# Patient Record
Sex: FEMALE | Race: ASIAN | NOT HISPANIC OR LATINO | URBAN - METROPOLITAN AREA
[De-identification: names, ages, dates, MRNs, and addresses within clinical notes are randomized per-mention and may not be internally consistent; named-entity substitution may affect disease eponyms.]

---

## 2022-11-30 ENCOUNTER — PATIENT ROUNDING (BHMG ONLY) (OUTPATIENT)
Dept: FAMILY MEDICINE CLINIC | Facility: CLINIC | Age: 27
End: 2022-11-30

## 2022-11-30 ENCOUNTER — OFFICE VISIT (OUTPATIENT)
Dept: FAMILY MEDICINE CLINIC | Facility: CLINIC | Age: 27
End: 2022-11-30

## 2022-11-30 VITALS
HEIGHT: 65 IN | SYSTOLIC BLOOD PRESSURE: 100 MMHG | BODY MASS INDEX: 22.71 KG/M2 | HEART RATE: 82 BPM | TEMPERATURE: 98 F | OXYGEN SATURATION: 98 % | WEIGHT: 136.3 LBS | DIASTOLIC BLOOD PRESSURE: 70 MMHG

## 2022-11-30 DIAGNOSIS — Z12.4 ENCOUNTER FOR PAPANICOLAOU SMEAR FOR CERVICAL CANCER SCREENING: ICD-10-CM

## 2022-11-30 DIAGNOSIS — L72.3 INFLAMED SEBACEOUS CYST: ICD-10-CM

## 2022-11-30 DIAGNOSIS — Z00.00 ENCOUNTER FOR PREVENTIVE CARE: ICD-10-CM

## 2022-11-30 DIAGNOSIS — Z00.00 ANNUAL PHYSICAL EXAM: Primary | ICD-10-CM

## 2022-11-30 DIAGNOSIS — Z23 NEED FOR PROPHYLACTIC VACCINATION WITH COMBINED DIPHTHERIA-TETANUS-PERTUSSIS (DTP) VACCINE: ICD-10-CM

## 2022-11-30 PROCEDURE — 90715 TDAP VACCINE 7 YRS/> IM: CPT | Performed by: STUDENT IN AN ORGANIZED HEALTH CARE EDUCATION/TRAINING PROGRAM

## 2022-11-30 PROCEDURE — 90471 IMMUNIZATION ADMIN: CPT | Performed by: STUDENT IN AN ORGANIZED HEALTH CARE EDUCATION/TRAINING PROGRAM

## 2022-11-30 PROCEDURE — 99213 OFFICE O/P EST LOW 20 MIN: CPT | Performed by: STUDENT IN AN ORGANIZED HEALTH CARE EDUCATION/TRAINING PROGRAM

## 2022-11-30 PROCEDURE — 99385 PREV VISIT NEW AGE 18-39: CPT | Performed by: STUDENT IN AN ORGANIZED HEALTH CARE EDUCATION/TRAINING PROGRAM

## 2022-11-30 NOTE — PROGRESS NOTES
November 30, 2022      Junior Alcocer,     I want to officially welcome you to our practice and ask about your recent visit.     Overall were you satisfied with your visit? YES      Would you recommend our office to friends and family?   YES    Your experience is very important to us.  You may receive an email regarding a survey.  Please take a moment to complete.  This will help us to improve.      I appreciate you taking the time to answer these questions.       Thank you and have a great day.

## 2022-11-30 NOTE — PROGRESS NOTES
"  Chief Complaint  Pt presented to clinic with   Chief Complaint   Patient presents with   • Establish Care          History of Present Illness  Ms. Segura 27-year-old female who presented to the clinic for the first time for establishing medical care, annual physical exam and with chief complaint of having lump on the front of her chest for almost a year.  Patient stated her lump was actually very small in size in the beginning even smaller than the size of a pea but recently she noticed it is growing bigger in size and then since last week lump is painful.  Review of Systems   Constitutional: Negative for activity change, appetite change, fatigue, fever and unexpected weight change.   HENT: Negative for congestion, rhinorrhea, sore throat and voice change.    Respiratory: Negative for cough, chest tightness, shortness of breath and wheezing.    Cardiovascular: Negative for chest pain and palpitations.   Gastrointestinal: Negative for abdominal distention, abdominal pain, diarrhea, nausea and vomiting.   Genitourinary: Negative for difficulty urinating and dysuria.   Musculoskeletal: Negative for arthralgias and myalgias.   Skin: Negative for rash.   Neurological: Negative for dizziness, tremors and weakness.   Hematological: Negative for adenopathy.   Psychiatric/Behavioral: Negative for sleep disturbance. The patient is not nervous/anxious.        PMH:   No outpatient medications prior to visit.     No facility-administered medications prior to visit.      No Known Allergies  History reviewed. No pertinent surgical history.  family history is not on file.   reports that she has never smoked. She has never used smokeless tobacco. She reports current alcohol use of about 2.0 standard drinks per week. She reports that she does not use drugs.     /70   Pulse 82   Temp 98 °F (36.7 °C)   Ht 165.1 cm (65\")   Wt 61.8 kg (136 lb 4.8 oz)   SpO2 98%   BMI 22.68 kg/m²   Physical Exam  HENT:      Head: " Normocephalic and atraumatic.      Mouth/Throat:      Mouth: Mucous membranes are moist.      Pharynx: Oropharynx is clear.   Eyes:      Extraocular Movements: Extraocular movements intact.      Conjunctiva/sclera: Conjunctivae normal.      Pupils: Pupils are equal, round, and reactive to light.   Cardiovascular:      Rate and Rhythm: Normal rate and regular rhythm.   Pulmonary:      Effort: Pulmonary effort is normal.      Breath sounds: Normal breath sounds.   Abdominal:      General: Bowel sounds are normal.      Palpations: Abdomen is soft.   Musculoskeletal:         General: Normal range of motion.      Cervical back: Neck supple.   Skin:     General: Skin is warm.      Capillary Refill: Capillary refill takes less than 2 seconds.   Neurological:      General: No focal deficit present.      Mental Status: She is alert and oriented to person, place, and time. Mental status is at baseline.   Psychiatric:         Mood and Affect: Mood normal.          Diagnoses and all orders for this visit:    1. Annual physical exam (Primary)    2. Encounter for Papanicolaou smear for cervical cancer screening  -     Ambulatory Referral to Obstetrics / Gynecology    3. Need for prophylactic vaccination with combined diphtheria-tetanus-pertussis (DTP) vaccine  -     Tdap Vaccine Greater Than or Equal To 6yo IM    4. Inflamed sebaceous cyst    Annual physical exam  Patient stated labs have been done in her office and she will leave lab report for scanning later today.  Will review the lab report.    Sebaceous cyst  Advised patient to apply antibiotic cream over the lump, sample given today  It is possible that secretion can come out from the lump by itself and then lump is expected to resolve or if it continued to be growing and painful then patient will update me and will refer patient to the surgeon  Needs yearly Flu vaccine  Dental visit and exam every year  Seat Belt always when driving or riding cars  Safe sex life to avoid  STD  Healthy heart diet  Exercise 3 times a week    Preventive care  Tdap shot given today, flu shot not available in the office, encourage patient to get flu shot from her pharmacy  Pap smear referral given    Follow Up  1 year

## 2022-12-08 DIAGNOSIS — L08.9 INFECTED SEBACEOUS CYST: Primary | ICD-10-CM

## 2022-12-08 DIAGNOSIS — L72.3 INFECTED SEBACEOUS CYST: Primary | ICD-10-CM

## 2022-12-08 RX ORDER — DOXYCYCLINE HYCLATE 100 MG/1
100 CAPSULE ORAL 2 TIMES DAILY
Qty: 10 CAPSULE | Refills: 0 | Status: SHIPPED | OUTPATIENT
Start: 2022-12-08 | End: 2022-12-22

## 2022-12-15 DIAGNOSIS — L72.3 INFECTED SEBACEOUS CYST: Primary | ICD-10-CM

## 2022-12-15 DIAGNOSIS — L08.9 INFECTED SEBACEOUS CYST: Primary | ICD-10-CM

## 2022-12-22 ENCOUNTER — OFFICE VISIT (OUTPATIENT)
Dept: SURGERY | Facility: CLINIC | Age: 27
End: 2022-12-22

## 2022-12-22 ENCOUNTER — TELEPHONE (OUTPATIENT)
Dept: SURGERY | Facility: CLINIC | Age: 27
End: 2022-12-22

## 2022-12-22 VITALS — HEIGHT: 65 IN | BODY MASS INDEX: 22.33 KG/M2 | WEIGHT: 134 LBS

## 2022-12-22 DIAGNOSIS — L08.9 INFECTED SEBACEOUS CYST: Primary | ICD-10-CM

## 2022-12-22 DIAGNOSIS — L72.3 INFECTED SEBACEOUS CYST: Primary | ICD-10-CM

## 2022-12-22 PROCEDURE — 10060 I&D ABSCESS SIMPLE/SINGLE: CPT | Performed by: SURGERY

## 2022-12-22 PROCEDURE — 87070 CULTURE OTHR SPECIMN AEROBIC: CPT | Performed by: SURGERY

## 2022-12-22 PROCEDURE — 87205 SMEAR GRAM STAIN: CPT | Performed by: SURGERY

## 2022-12-22 PROCEDURE — 99203 OFFICE O/P NEW LOW 30 MIN: CPT | Performed by: SURGERY

## 2022-12-22 RX ORDER — DOXYCYCLINE HYCLATE 100 MG/1
100 CAPSULE ORAL 2 TIMES DAILY
Qty: 14 CAPSULE | Refills: 0 | Status: SHIPPED | OUTPATIENT
Start: 2022-12-22 | End: 2022-12-29

## 2022-12-23 NOTE — PROGRESS NOTES
Procedure note     DATE OF Procedure:12/22/23      SURGEON:   Yimi Schultz MD     PREOPERATIVE DIAGNOSIS: Infected sebaceous cyst     POSTOPERATIVE DIAGNOSIS: Infected sebaceous cyst with abscess     PROCEDURE PERFORMED: Incision and drainage of infected sebaceous cyst with abscess     ANESTHESIA: Local     SPECIMEN: Wound Culture     DRAINS: None     BLOOD LOSS: minimal     INDICATIONS FOR OPERATION: Ms. Imelda Alcocer is a 27 y.o. lady with an infected sebaceous cyst on her right upper chest.  Incision and drainage of the abscess and cyst was recommended. All risks , benefits, and alternatives were explained to the patient and she agreed and wished to proceed.  Informed consent was obtained.     OPERATIVE REPORT: In the procedure room a timeout was performed identifying correct patient, procedure, and location of procedure.  The area of the abscess was prepped and draped per clinic policy.  Local anesthetic was injected overlying the area of maximum fluctuance.  An incision was made over this area and a culture obtained.    10 mL of purulent and keratinaceous material was expressed from the cavity.  The cavity was irrigated and packed with Nu Gauze and covered with a dry dressing.  She tolerated the procedure well.        Yimi Schultz M.D.  General and Endoscopic Surgery  [facility]     [address], [address]  [place], [place], [address]  P: [phone]  F: [phone]

## 2022-12-23 NOTE — PROGRESS NOTES
General Surgery H&P/Consultation    Impression/Plan:    Miss. Imelda Alcocer is a 27 y.o. with an infected sebaceous cyst on her right chest.  She has been on doxycycline.  I recommended incision and drainage of the cyst to resolve the acute infection.  Should she have recurrence of the cyst in the future I would recommend elective excision at that time as this will minimize her scarring related to the procedure. All risks (including bleeding, infection, damage to surrounding structures), benefits, and alternatives were explained to the patient who agreed and wished to proceed.  I will see her back in 1 week and have prescribed an additional week of doxycycline to complete after drainage.    Referring Provider: Walt Edouard MD    Chief Complaint:    Infected cyst    History of Present Illness:    Miss. Imelda Alcocer is a 27 y.o. presenting for evaluation of an infected cyst on her chest.  She originally noticed a pea-sized nodule underneath the skin.  Since Thanksgiving she has noted that this has been getting larger and more inflamed.  She is currently on doxycycline to treat this.    Past Medical History:   History reviewed. No pertinent past medical history.       Past Surgical History:    History reviewed. No pertinent surgical history.      Family History:    History reviewed. No pertinent family history.      Social History:    Social History     Socioeconomic History   • Marital status:    Tobacco Use   • Smoking status: Never   • Smokeless tobacco: Never   Vaping Use   • Vaping Use: Never used   Substance and Sexual Activity   • Alcohol use: Yes     Alcohol/week: 2.0 standard drinks     Types: 1 Glasses of wine, 1 Drinks containing 0.5 oz of alcohol per week   • Drug use: Never   • Sexual activity: Yes     Partners: Male     Birth control/protection: Condom         Allergies:   No Known Allergies    Medications:     Current Outpatient Medications:   •  doxycycline (VIBRAMYCIN) 100 MG  capsule, Take 1 capsule by mouth 2 (Two) Times a Day for 7 days., Disp: 14 capsule, Rfl: 0    Radiology/Endoscopy:    • No relevant radiology to review    Labs:    • No relevant labs for review        Physical Exam:   • Constitutional: Well-developed well-nourished, no acute distress  • Skin: Fluctuant, indurated, erythematous 3 cm cyst/abscess on the upper inner quadrant of the right  breast near the sternum        Yimi Schultz MD  General and Endoscopic Surgery  [facility]    [address], [address]  [place], [place], [address]  P: [phone]  F: [phone]

## 2022-12-25 LAB
BACTERIA SPEC AEROBE CULT: NORMAL
GRAM STN SPEC: NORMAL

## 2022-12-27 RX ORDER — CEPHALEXIN 500 MG/1
500 CAPSULE ORAL 4 TIMES DAILY
Qty: 12 CAPSULE | Refills: 0 | Status: SHIPPED | OUTPATIENT
Start: 2022-12-27 | End: 2022-12-30

## 2022-12-28 ENCOUNTER — OFFICE VISIT (OUTPATIENT)
Dept: FAMILY MEDICINE CLINIC | Facility: CLINIC | Age: 27
End: 2022-12-28

## 2022-12-28 VITALS
SYSTOLIC BLOOD PRESSURE: 102 MMHG | WEIGHT: 138.2 LBS | TEMPERATURE: 98.4 F | BODY MASS INDEX: 23.03 KG/M2 | HEART RATE: 67 BPM | HEIGHT: 65 IN | OXYGEN SATURATION: 100 % | DIASTOLIC BLOOD PRESSURE: 62 MMHG

## 2022-12-28 DIAGNOSIS — K22.4 ESOPHAGEAL SPASM: Primary | ICD-10-CM

## 2022-12-28 DIAGNOSIS — K59.00 CONSTIPATION, UNSPECIFIED CONSTIPATION TYPE: ICD-10-CM

## 2022-12-28 PROCEDURE — 99214 OFFICE O/P EST MOD 30 MIN: CPT | Performed by: STUDENT IN AN ORGANIZED HEALTH CARE EDUCATION/TRAINING PROGRAM

## 2022-12-28 RX ORDER — POLYETHYLENE GLYCOL 3350 17 G/17G
17 POWDER, FOR SOLUTION ORAL DAILY
Qty: 10 EACH | Refills: 0 | Status: SHIPPED | OUTPATIENT
Start: 2022-12-28 | End: 2023-01-05

## 2022-12-28 RX ORDER — DICYCLOMINE HYDROCHLORIDE 10 MG/1
10 CAPSULE ORAL
Qty: 30 CAPSULE | Refills: 0 | Status: SHIPPED | OUTPATIENT
Start: 2022-12-28

## 2022-12-28 NOTE — PROGRESS NOTES
"Chief Complaint  Back Pain (Upper middle back, hurting when swallowing, shooting pain, pressure on chest radiates to back)    Smiley Alcocer presents to [facility]  History of Present Illness  For upper mid back pain since past 7 days.  2 weeks ago patient was started on doxycycline for infected sebaceous cyst.  Patient underwent incision and drainage a week ago and last dose of doxycycline she took was this last Monday.  Patient stated she developed symptoms of back pain and severe cramping like pain especially on drinking water or eating food.  In the beginning 5 days ago patient symptoms severity was on scale 9.  Then 2 days ago patient went to ER waited there for 8 hours and then returned back home without being evaluated by any physician.  Patient stated she took muscle relaxant and after that she noticed her symptoms scale severity decreases to 2.  Today morning she noticed her symptom severity increases to 6 again and came here for evaluation.  Denies having any abdominal pain, nausea or vomiting.  Patient is having issues of constipation.  Review of system is negative for fever, headache, chest pain, shortness of breath, palpitation, nausea, vomiting, any recent change in bladder habits.    Back Pain  This is a new problem. The current episode started in the past 7 days. The problem occurs rarely. The problem has been waxing and waning since onset. The pain is present in the thoracic spine. The quality of the pain is described as cramping and shooting. The pain does not radiate. The pain is at a severity of 8/10. The pain is the same all the time. The symptoms are aggravated by position. Stiffness is present all day. Risk factors include lack of exercise and poor posture.       Objective   Vital Signs:  /62 (BP Location: Left arm, Patient Position: Sitting)   Pulse 67   Temp 98.4 °F (36.9 °C)   Ht 165.1 cm (65\")   Wt 62.7 kg (138 lb 3.2 oz)   " "SpO2 100%   BMI 23.00 kg/m²   Estimated body mass index is 23 kg/m² as calculated from the following:    Height as of this encounter: 165.1 cm (65\").    Weight as of this encounter: 62.7 kg (138 lb 3.2 oz).    BMI is within normal parameters. No other follow-up for BMI required.      Physical Exam  HENT:      Head: Normocephalic and atraumatic.      Mouth/Throat:      Mouth: Mucous membranes are moist.      Pharynx: Oropharynx is clear.   Eyes:      Extraocular Movements: Extraocular movements intact.      Conjunctiva/sclera: Conjunctivae normal.      Pupils: Pupils are equal, round, and reactive to light.   Cardiovascular:      Rate and Rhythm: Normal rate and regular rhythm.   Pulmonary:      Effort: Pulmonary effort is normal.      Breath sounds: Normal breath sounds.   Abdominal:      General: Bowel sounds are normal.      Palpations: Abdomen is soft.   Musculoskeletal:         General: Normal range of motion.      Cervical back: Neck supple.   Skin:     General: Skin is warm.      Capillary Refill: Capillary refill takes less than 2 seconds.   Neurological:      General: No focal deficit present.      Mental Status: She is alert and oriented to person, place, and time. Mental status is at baseline.   Psychiatric:         Mood and Affect: Mood normal.        Result Review :                Assessment and Plan   Diagnoses and all orders for this visit:    1. Esophageal spasm (Primary)  -     dicyclomine (BENTYL) 10 MG capsule; Take 1 capsule by mouth 3 (Three) Times a Day Before Meals.  Dispense: 30 capsule; Refill: 0    2. Constipation, unspecified constipation type  -     polyethylene glycol (MIRALAX) 17 g packet; Take 17 g by mouth Daily.  Dispense: 10 each; Refill: 0    It is possible that patient is getting esophageal spasm/esophagitis from doxycycline.  Patient is advised not to take doxycycline again.  Advised to take Protonix 2 times a day for 2 weeks and after that once a day for 1 week and then as " needed  Also prescribed dicyclomine 1 capsule 3 times a day before meals at least 30 minutes.  Patient can try dicyclomine for a day or 2 and if symptoms are improving then patient can take it as needed.  Patient is advised to come back if no relief from dicyclomine  RTC or ER if symptoms worsen or persist      Constipation  Patient is given MiraLAX powder to drink every day and also educated that dicyclomine can also cause constipation.         Follow Up   No follow-ups on file.  Patient was given instructions and counseling regarding her condition or for health maintenance advice. Please see specific information pulled into the AVS if appropriate.       Answers for HPI/ROS submitted by the patient on 12/28/2022  What is the primary reason for your visit?: Back Pain

## 2023-01-05 ENCOUNTER — OFFICE VISIT (OUTPATIENT)
Dept: SURGERY | Facility: CLINIC | Age: 28
End: 2023-01-05
Payer: COMMERCIAL

## 2023-01-05 DIAGNOSIS — L72.3 INFECTED SEBACEOUS CYST: Primary | ICD-10-CM

## 2023-01-05 DIAGNOSIS — L08.9 INFECTED SEBACEOUS CYST: Primary | ICD-10-CM

## 2023-01-05 PROCEDURE — 99213 OFFICE O/P EST LOW 20 MIN: CPT | Performed by: SURGERY

## 2023-01-06 NOTE — PROGRESS NOTES
ASSESSMENT/PLAN:    Ms. Yanes is a 27-year-old with I&D of an infected sebaceous cyst on 12/22/2022.  She was unable to take doxycycline due to nausea and gastritis.  The cyst has no signs of surrounding infection currently and culture was no growth with rare bacteria on the gram stain.  I have recommended allowing the cyst to grow followed by an office excision in 2 weeks.  The risk, benefits, and alternatives were discussed with her and she is in agreement with proceeding.    CC:     Chief Complaint   Patient presents with   • Follow-up        HPI:    Minimal drainage from the cyst at the site of the I&D.  She was unable to tolerate the doxycycline and given improvement did not take the Keflex.  No fevers or purulent drainage from the I&D site    SOCIAL HISTORY:   Social Determinants of Health     Tobacco Use: Low Risk    • Smoking Tobacco Use: Never   • Smokeless Tobacco Use: Never   • Passive Exposure: Not on file   Alcohol Use: Not on file   Financial Resource Strain: Not on file   Food Insecurity: Not on file   Transportation Needs: Not on file   Physical Activity: Not on file   Stress: Not on file   Social Connections: Not on file   Intimate Partner Violence: Not on file   Depression: Not at risk   • PHQ-2 Score: 0   Housing Stability: Not on file        FAMILY HISTORY:    History reviewed. No pertinent family history.     OTHER SURGERY:  History reviewed. No pertinent surgical history.     PAST MEDICAL HISTORY:    History reviewed. No pertinent past medical history.     MEDICATIONS:   Current Outpatient Medications on File Prior to Visit   Medication Sig Dispense Refill   • dicyclomine (BENTYL) 10 MG capsule Take 1 capsule by mouth 3 (Three) Times a Day Before Meals. 30 capsule 0     No current facility-administered medications on file prior to visit.        ALLERGIES:   Allergies   Allergen Reactions   • Doxycycline GI Intolerance        PHYSICAL EXAM:   • Constitutional: Well-developed well-nourished, no  acute distress  • Skin: 1.5 cm cyst of right medial chest without cellulitis or significant induration, healing site of incision and drainage    Yimi Schultz M.D.  General and Endoscopic Surgery  [facility]    [address], [address]  [place], [place], [address]  P: [phone]  F: [phone]

## 2023-02-02 ENCOUNTER — PROCEDURE VISIT (OUTPATIENT)
Dept: SURGERY | Facility: CLINIC | Age: 28
End: 2023-02-02
Payer: COMMERCIAL

## 2023-02-02 VITALS — HEIGHT: 65 IN | WEIGHT: 138 LBS | BODY MASS INDEX: 22.99 KG/M2

## 2023-02-02 DIAGNOSIS — L72.3 SEBACEOUS CYST: ICD-10-CM

## 2023-02-02 DIAGNOSIS — L08.9 INFECTED SEBACEOUS CYST: Primary | ICD-10-CM

## 2023-02-02 DIAGNOSIS — L72.3 INFECTED SEBACEOUS CYST: Primary | ICD-10-CM

## 2023-02-02 PROCEDURE — 11402 EXC TR-EXT B9+MARG 1.1-2 CM: CPT | Performed by: SURGERY

## 2023-02-02 PROCEDURE — 12031 INTMD RPR S/A/T/EXT 2.5 CM/<: CPT | Performed by: SURGERY

## 2023-02-02 PROCEDURE — 88304 TISSUE EXAM BY PATHOLOGIST: CPT | Performed by: SURGERY

## 2023-02-02 NOTE — PROGRESS NOTES
DATE OF Procedure: 02/02/23      SURGEON:   Yimi Schultz MD     PREOPERATIVE DIAGNOSIS:  History of ruptured epidermal inclusion cyst     POSTOPERATIVE DIAGNOSIS: Same     PROCEDURE PERFORMED:  Excision of epidermal inclusion cyst     ANESTHESIA: Local     SPECIMEN:  Epidermal inclusion cyst (1.5 x 1 cm)     BLOOD LOSS: minimal     INDICATIONS FOR OPERATION:  Ms. Yanes is a 27-year-old lady with history of a ruptured and infected epidermal inclusion cyst status post incision and drainage on her chest.  Recommended proceeding with excision of the remaining cyst cavity now that it is healed and there is no signs of infection..    She desired excision. All risks , benefits, and alternatives were explained to the patient and  she agreed and wished to proceed.  Informed consent was obtained.     OPERATIVE REPORT: In the procedure room a timeout was performed identifying correct patient, procedure, and location of procedure.    Local anesthetic was injected.  An elliptical skin incision was made overlying the soft tissue mass at the site of the skin lesion.  The cyst cavity and surrounding subcutaneous tissue was removed.  It measured 1.5 x 1 x 1 cm.  The skin was closed with 3-0 Vicryl and 4-0 Monocryl in 2 layers.  Glue was applied over the incision.  She tolerated the procedure well.          Yimi Schultz M.D.  General and Endoscopic Surgery  [facility]     [address], [address]  [place], [place], [address]  P: [phone]  F: [phone]

## 2023-02-06 LAB
LAB AP CASE REPORT: NORMAL
PATH REPORT.FINAL DX SPEC: NORMAL
PATH REPORT.GROSS SPEC: NORMAL

## 2023-02-21 ENCOUNTER — OFFICE VISIT (OUTPATIENT)
Dept: OBSTETRICS AND GYNECOLOGY | Facility: CLINIC | Age: 28
End: 2023-02-21
Payer: COMMERCIAL

## 2023-02-21 VITALS
BODY MASS INDEX: 22.33 KG/M2 | DIASTOLIC BLOOD PRESSURE: 69 MMHG | WEIGHT: 134 LBS | SYSTOLIC BLOOD PRESSURE: 100 MMHG | HEIGHT: 65 IN

## 2023-02-21 DIAGNOSIS — Z12.4 CERVICAL CANCER SCREENING: ICD-10-CM

## 2023-02-21 DIAGNOSIS — N89.8 VAGINAL DISCHARGE: ICD-10-CM

## 2023-02-21 DIAGNOSIS — Z01.419 WELL WOMAN EXAM WITH ROUTINE GYNECOLOGICAL EXAM: Primary | ICD-10-CM

## 2023-02-21 PROCEDURE — 99385 PREV VISIT NEW AGE 18-39: CPT | Performed by: STUDENT IN AN ORGANIZED HEALTH CARE EDUCATION/TRAINING PROGRAM

## 2023-02-21 NOTE — PROGRESS NOTES
"GYN Annual Exam     CC- Here for annual exam.     Imelda Alcocer is a 27 y.o. female who presents for annual well woman exam. Periods are regular every 28-30 days, lasting 5 days. Dysmenorrhea:moderate, occurring first 1-2 days of flow- uses muscle relaxer and ibuprofen as needed. Cyclic symptoms include dizziness, nausea, and brain fog. No intermenstrual bleeding, spotting, or discharge.  She reports that her  does occasionally have burning and itching of his penis following unprotected intercourse. The patient denies abnormal vaginal discharge, vaginal bleeding, or itching.     OB History    No obstetric history on file.         Currently sexually active with one male partner, her .  Current contraception: condoms  History of abnormal Pap smear: n/a  Family history of uterine, colon or ovarian cancer: no  History of abnormal mammogram: n/a  Family history of breast cancer: no  Last Pap : n/a   She has not received HPV vaccine.     History reviewed. No pertinent past medical history.    History reviewed. No pertinent surgical history.      Current Outpatient Medications:   •  dicyclomine (BENTYL) 10 MG capsule, Take 1 capsule by mouth 3 (Three) Times a Day Before Meals., Disp: 30 capsule, Rfl: 0    Allergies   Allergen Reactions   • Doxycycline GI Intolerance       Social History     Tobacco Use   • Smoking status: Never   • Smokeless tobacco: Never   Vaping Use   • Vaping Use: Never used   Substance Use Topics   • Alcohol use: Yes     Alcohol/week: 2.0 standard drinks     Types: 1 Glasses of wine, 1 Drinks containing 0.5 oz of alcohol per week   • Drug use: Never       History reviewed. No pertinent family history.    Review of Systems   All other systems reviewed and are negative.      /69   Ht 165.1 cm (65\")   Wt 60.8 kg (134 lb)   LMP 2023   BMI 22.30 kg/m²     Physical Exam  Vitals reviewed. Exam conducted with a chaperone present.   Constitutional:       General: She " is not in acute distress.  HENT:      Head: Normocephalic and atraumatic.      Right Ear: External ear normal.      Left Ear: External ear normal.   Eyes:      Extraocular Movements: Extraocular movements intact.      Pupils: Pupils are equal, round, and reactive to light.   Cardiovascular:      Rate and Rhythm: Normal rate and regular rhythm.   Pulmonary:      Effort: Pulmonary effort is normal. No respiratory distress.   Chest:   Breasts:     Right: No swelling, bleeding, inverted nipple, mass, nipple discharge, skin change or tenderness.      Left: No swelling, bleeding, inverted nipple, mass, nipple discharge, skin change or tenderness.   Abdominal:      General: There is no distension.      Palpations: Abdomen is soft.      Tenderness: There is no abdominal tenderness. There is no guarding or rebound.   Genitourinary:     General: Normal vulva.      Exam position: Lithotomy position.      Labia:         Right: No rash, tenderness, lesion or injury.         Left: No rash, tenderness, lesion or injury.       Urethra: No prolapse or urethral swelling.      Vagina: No vaginal discharge, erythema, tenderness, bleeding or lesions.      Cervix: Normal.      Uterus: Not enlarged, not fixed and not tender.       Adnexa:         Right: No mass, tenderness or fullness.          Left: No mass, tenderness or fullness.     Musculoskeletal:         General: No deformity. Normal range of motion.      Cervical back: Normal range of motion and neck supple.   Lymphadenopathy:      Upper Body:      Right upper body: No supraclavicular or axillary adenopathy.      Left upper body: No supraclavicular or axillary adenopathy.      Lower Body: No right inguinal adenopathy. No left inguinal adenopathy.   Skin:     General: Skin is warm and dry.   Neurological:      General: No focal deficit present.      Mental Status: She is alert and oriented to person, place, and time.   Psychiatric:         Mood and Affect: Mood normal.          Behavior: Behavior normal.         Assessment     1) GYN annual well woman exam.   2) Cervical cancer screening   3)Vaginal discharge      Plan     1) Breast Health - Clinical breast exam yearly, Self breast awareness monthly  2) Pap - Collected pap smear for cervical cancer screening.   3) Smoking status- non-smoker   4) Activity recommends - Adult 150-300 min/week of multi-component physical activities that include balance training, aerobic and physical strengthening.    5) Contraception- Condoms. I reviewed other options of birth control including OCPs, POPs, vaginal rings, patches, injections, implants, or IUDs. She is planning to continue with condoms at this time but will consider other methods.   6) Vaginal discharge- Collected NuSwab BV/candida to rule out vaginitis that may be leading to her partner's symptoms with unprotected intercourse. Recommended that if her swab returns negative, her partner should seek evaluation with his physician.   7) Follow up prn and one year.       Marya Don MD  2/21/2023  19:08 EST

## 2023-02-23 ENCOUNTER — PATIENT MESSAGE (OUTPATIENT)
Dept: OBSTETRICS AND GYNECOLOGY | Facility: CLINIC | Age: 28
End: 2023-02-23
Payer: COMMERCIAL

## 2023-02-23 ENCOUNTER — PATIENT ROUNDING (BHMG ONLY) (OUTPATIENT)
Dept: OBSTETRICS AND GYNECOLOGY | Facility: CLINIC | Age: 28
End: 2023-02-23
Payer: COMMERCIAL

## 2023-02-23 LAB
A VAGINAE DNA VAG QL NAA+PROBE: NORMAL SCORE
BVAB2 DNA VAG QL NAA+PROBE: NORMAL SCORE
C ALBICANS DNA VAG QL NAA+PROBE: NEGATIVE
C GLABRATA DNA VAG QL NAA+PROBE: NEGATIVE
MEGA1 DNA VAG QL NAA+PROBE: NORMAL SCORE

## 2023-02-23 NOTE — PROGRESS NOTES
My chart message has been sent to the patient for PATIENT ROUNDING with [facility].

## 2023-03-01 LAB
CONV .: NORMAL
CYTOLOGIST CVX/VAG CYTO: NORMAL
CYTOLOGY CVX/VAG DOC CYTO: NORMAL
CYTOLOGY CVX/VAG DOC THIN PREP: NORMAL
DX ICD CODE: NORMAL
HIV 1 & 2 AB SER-IMP: NORMAL
Lab: NORMAL
OTHER STN SPEC: NORMAL
STAT OF ADQ CVX/VAG CYTO-IMP: NORMAL